# Patient Record
Sex: MALE | Race: WHITE | Employment: FULL TIME | URBAN - METROPOLITAN AREA
[De-identification: names, ages, dates, MRNs, and addresses within clinical notes are randomized per-mention and may not be internally consistent; named-entity substitution may affect disease eponyms.]

---

## 2017-06-16 ENCOUNTER — HOSPITAL ENCOUNTER (OUTPATIENT)
Dept: ULTRASOUND IMAGING | Age: 37
Discharge: HOME | End: 2017-06-16
Attending: NURSE PRACTITIONER
Payer: COMMERCIAL

## 2017-06-16 DIAGNOSIS — N50.819 TESTICULAR PAIN: ICD-10-CM

## 2017-06-16 DIAGNOSIS — R32 URINARY INCONTINENCE, UNSPECIFIED TYPE: ICD-10-CM

## 2017-06-16 DIAGNOSIS — R22.2 LUMP OF SKIN OF BACK: ICD-10-CM

## 2017-06-16 PROCEDURE — 76882 US LMTD JT/FCL EVL NVASC XTR: CPT

## 2017-06-16 PROCEDURE — 76870 US EXAM SCROTUM: CPT

## 2017-06-19 NOTE — PROGRESS NOTES
Palpable lump on left lower back is a lipoma and generally nothing to worry about. Palpable area on chest likely tip of xiphoid process.

## 2017-09-25 PROBLEM — K92.1 BLOOD IN STOOL: Status: ACTIVE | Noted: 2017-09-25

## 2017-09-25 PROBLEM — N50.819 TESTICULAR PAIN: Status: ACTIVE | Noted: 2017-09-25
